# Patient Record
Sex: FEMALE | Race: BLACK OR AFRICAN AMERICAN | NOT HISPANIC OR LATINO
[De-identification: names, ages, dates, MRNs, and addresses within clinical notes are randomized per-mention and may not be internally consistent; named-entity substitution may affect disease eponyms.]

---

## 2021-04-22 ENCOUNTER — TRANSCRIPTION ENCOUNTER (OUTPATIENT)
Age: 66
End: 2021-04-22

## 2021-06-27 ENCOUNTER — TRANSCRIPTION ENCOUNTER (OUTPATIENT)
Age: 66
End: 2021-06-27

## 2022-02-27 ENCOUNTER — TRANSCRIPTION ENCOUNTER (OUTPATIENT)
Age: 67
End: 2022-02-27

## 2022-04-12 ENCOUNTER — TRANSCRIPTION ENCOUNTER (OUTPATIENT)
Age: 67
End: 2022-04-12

## 2022-04-25 PROBLEM — Z00.00 ENCOUNTER FOR PREVENTIVE HEALTH EXAMINATION: Status: ACTIVE | Noted: 2022-04-25

## 2022-04-26 ENCOUNTER — APPOINTMENT (OUTPATIENT)
Dept: HEART AND VASCULAR | Facility: CLINIC | Age: 67
End: 2022-04-26
Payer: MEDICARE

## 2022-04-26 ENCOUNTER — NON-APPOINTMENT (OUTPATIENT)
Age: 67
End: 2022-04-26

## 2022-04-26 VITALS
DIASTOLIC BLOOD PRESSURE: 88 MMHG | HEIGHT: 61 IN | SYSTOLIC BLOOD PRESSURE: 150 MMHG | RESPIRATION RATE: 14 BRPM | HEART RATE: 66 BPM | BODY MASS INDEX: 33.99 KG/M2 | WEIGHT: 180 LBS

## 2022-04-26 DIAGNOSIS — R01.1 CARDIAC MURMUR, UNSPECIFIED: ICD-10-CM

## 2022-04-26 PROCEDURE — ZZZZZ: CPT

## 2022-04-26 PROCEDURE — 93000 ELECTROCARDIOGRAM COMPLETE: CPT

## 2022-04-26 PROCEDURE — 93306 TTE W/DOPPLER COMPLETE: CPT

## 2022-04-26 PROCEDURE — 99203 OFFICE O/P NEW LOW 30 MIN: CPT

## 2022-05-02 NOTE — REASON FOR VISIT
[Hypertension] : hypertension [Other: ____] : [unfilled] [FreeTextEntry3] : Jeanne [FreeTextEntry1] : CV evaluation

## 2022-05-02 NOTE — DISCUSSION/SUMMARY
[ECG] : ECG [Exercise Regimen] : an exercise regimen [Low Sodium Diet] : low sodium diet [NSAIDs Avoidance] : non-steroidal anti-inflammatory drugs avoidance [Mild Mitral Regurgitation] : mild mitral regurgitation [Compensated] : compensated [Echocardiogram] : echocardiogram [None] : There are no changes in medication management [de-identified] : will observe off meds  [FreeTextEntry1] : Echo LVef 55% Mild DD  1+MR \par LA nl size

## 2022-05-02 NOTE — PHYSICAL EXAM
[5th Left ICS - MCL] : palpated at the 5th LICS in the midclavicular line [Rhythm Regular] : regular [Normal S1] : normal S1 [II] : a grade 2 [Normal] : normal gait [Right Carotid Bruit] : no bruit heard over the right carotid [Left Carotid Bruit] : no bruit heard over the left carotid

## 2022-05-02 NOTE — REVIEW OF SYSTEMS
[SOB] : shortness of breath [Negative] : Musculoskeletal [Leg Claudication] : no intermittent leg claudication [Syncope] : no syncope

## 2022-05-02 NOTE — HISTORY OF PRESENT ILLNESS
[FreeTextEntry1] : Patient is a 67-year-old black female with a family history of heart disease in mother who has idiopathic myopathy.  Patient was seen by her medical doctor and referred for cardiovascular evaluation.  She is nondiabetic nonhypertensive non-smoker with mild elevations of her cholesterol.  She history of any overt symptoms of breathlessness chest pain leg edema headaches dizziness palpitations syncope or near syncope.

## 2022-05-04 ENCOUNTER — APPOINTMENT (OUTPATIENT)
Dept: HEART AND VASCULAR | Facility: CLINIC | Age: 67
End: 2022-05-04
Payer: MEDICARE

## 2022-05-04 PROCEDURE — 93015 CV STRESS TEST SUPVJ I&R: CPT

## 2022-11-09 ENCOUNTER — NON-APPOINTMENT (OUTPATIENT)
Age: 67
End: 2022-11-09

## 2023-04-26 ENCOUNTER — APPOINTMENT (OUTPATIENT)
Dept: HEART AND VASCULAR | Facility: CLINIC | Age: 68
End: 2023-04-26
Payer: MEDICARE

## 2023-04-26 ENCOUNTER — NON-APPOINTMENT (OUTPATIENT)
Age: 68
End: 2023-04-26

## 2023-04-26 VITALS — HEART RATE: 65 BPM

## 2023-04-26 VITALS
HEIGHT: 61 IN | BODY MASS INDEX: 34.36 KG/M2 | WEIGHT: 182 LBS | DIASTOLIC BLOOD PRESSURE: 90 MMHG | SYSTOLIC BLOOD PRESSURE: 150 MMHG | HEART RATE: 65 BPM

## 2023-04-26 PROCEDURE — 93000 ELECTROCARDIOGRAM COMPLETE: CPT

## 2023-04-26 PROCEDURE — 99214 OFFICE O/P EST MOD 30 MIN: CPT

## 2023-04-26 NOTE — ADDENDUM
[FreeTextEntry1] : I, Lebron Lopez, assisted in documentation on 04/26/2023 acting as a scribe for Dr. Adonis Melgar.\par \par

## 2023-04-26 NOTE — REASON FOR VISIT
[FreeTextEntry1] : Referred by - Send Report to: Jeanne. \par TREY PLAZA is being seen for hypertension and Murmur. \par \par CV evaluation

## 2023-04-26 NOTE — PHYSICAL EXAM
[Well Developed] : well developed [Well Nourished] : well nourished [No Acute Distress] : no acute distress [Normal Conjunctiva] : normal conjunctiva [Normal Venous Pressure] : normal venous pressure [No Carotid Bruit] : no carotid bruit [5th Left ICS - MCL] : palpated at the 5th LICS in the midclavicular line [Normal] : normal [Rhythm Regular] : regular [Normal S1] : normal S1 [Normal S2] : normal S2 [II] : a grade 2 [Good Air Entry] : good air entry [Clear Lung Fields] : clear lung fields [No Respiratory Distress] : no respiratory distress  [Soft] : abdomen soft [Non Tender] : non-tender [No Masses/organomegaly] : no masses/organomegaly [Normal Bowel Sounds] : normal bowel sounds [Normal Gait] : normal gait [Right Carotid Bruit] : no bruit heard over the right carotid [Left Carotid Bruit] : no bruit heard over the left carotid

## 2023-04-26 NOTE — HISTORY OF PRESENT ILLNESS
[FreeTextEntry1] : 04/26/22\par Patient is a 67-year-old black female with a family history of heart disease in mother who has idiopathic myopathy. Patient was seen by her medical doctor and referred for cardiovascular evaluation. She is nondiabetic nonhypertensive non-smoker with mild elevations of her cholesterol. She history of any overt symptoms of breathlessness chest pain leg edema headaches dizziness palpitations syncope or near syncope. \par \par 04/26/23\par seen by PMD blood were normal \par no barrientos or headache or barrientos \par no swelling \par walks up to 1/2 hour per day slow pace

## 2023-04-26 NOTE — DISCUSSION/SUMMARY
[FreeTextEntry1] : Hypertension: The impression is will observe off meds. The diagnostic plan includes ECG and echocardiogram.There are no changes in medication management. Other planned treatment includes an exercise regimen, low sodium diet, non-steroidal anti-inflammatory drugs avoidance and wgt loss. \par Mitral Regurgitation: The impression is mild mitral regurgitation. Currently, the condition is compensated. The diagnostic plan includes echocardiogram.There are no changes in medication management. \par Echo LVef 55% Mild DD 1+MR \par LA nl size. \par  [EKG obtained to assist in diagnosis and management of assessed problem(s)] : EKG obtained to assist in diagnosis and management of assessed problem(s)

## 2023-04-27 ENCOUNTER — APPOINTMENT (OUTPATIENT)
Dept: HEART AND VASCULAR | Facility: CLINIC | Age: 68
End: 2023-04-27

## 2024-03-06 ENCOUNTER — NON-APPOINTMENT (OUTPATIENT)
Age: 69
End: 2024-03-06

## 2024-03-06 ENCOUNTER — APPOINTMENT (OUTPATIENT)
Dept: HEART AND VASCULAR | Facility: CLINIC | Age: 69
End: 2024-03-06
Payer: MEDICARE

## 2024-03-06 VITALS
HEART RATE: 69 BPM | SYSTOLIC BLOOD PRESSURE: 130 MMHG | BODY MASS INDEX: 34.74 KG/M2 | WEIGHT: 184 LBS | HEIGHT: 61 IN | DIASTOLIC BLOOD PRESSURE: 80 MMHG

## 2024-03-06 DIAGNOSIS — I34.0 NONRHEUMATIC MITRAL (VALVE) INSUFFICIENCY: ICD-10-CM

## 2024-03-06 PROCEDURE — 93000 ELECTROCARDIOGRAM COMPLETE: CPT

## 2024-03-06 PROCEDURE — 99213 OFFICE O/P EST LOW 20 MIN: CPT

## 2024-03-06 NOTE — PHYSICAL EXAM
[Well Developed] : well developed [Well Nourished] : well nourished [Normal Conjunctiva] : normal conjunctiva [No Acute Distress] : no acute distress [Normal Venous Pressure] : normal venous pressure [No Carotid Bruit] : no carotid bruit [5th Left ICS - MCL] : palpated at the 5th LICS in the midclavicular line [Normal] : normal [Rhythm Regular] : regular [Normal S1] : normal S1 [II] : a grade 2 [Normal S2] : normal S2 [Clear Lung Fields] : clear lung fields [Good Air Entry] : good air entry [Soft] : abdomen soft [No Respiratory Distress] : no respiratory distress  [Normal Bowel Sounds] : normal bowel sounds [No Masses/organomegaly] : no masses/organomegaly [Non Tender] : non-tender [Normal Gait] : normal gait [Right Carotid Bruit] : no bruit heard over the right carotid [de-identified] : NEG TINEL sign [Left Carotid Bruit] : no bruit heard over the left carotid

## 2024-03-06 NOTE — DISCUSSION/SUMMARY
[EKG obtained to assist in diagnosis and management of assessed problem(s)] : EKG obtained to assist in diagnosis and management of assessed problem(s) [FreeTextEntry1] : Hypertension: The impression is will observe off meds. The diagnostic plan includes ECG and echocardiogram.There are no changes in medication management. Other planned treatment includes an exercise regimen, low sodium diet, non-steroidal anti-inflammatory drugs avoidance and wgt loss.  Mitral Regurgitation: The impression is mild mitral regurgitation. Currently, the condition is compensated. The diagnostic plan includes echocardiogram.There are no changes in medication management.

## 2024-03-06 NOTE — HISTORY OF PRESENT ILLNESS
[FreeTextEntry1] : 04/26/22 Patient is a 67-year-old black female with a family history of heart disease in mother who has idiopathic myopathy. Patient was seen by her medical doctor and referred for cardiovascular evaluation. She is nondiabetic nonhypertensive non-smoker with mild elevations of her cholesterol. She history of any overt symptoms of breathlessness chest pain leg edema headaches dizziness palpitations syncope or near syncope.   04/26/23 seen by PMD blood were normal  no barrientos or headache or barrientos  no swelling  walks up to 1/2 hour per day slow pace   3/6/24 on no meds feels well no sscp or barrientos  skipped HB at times  no dizziness  goes gym and exercise regularly  left hand numbness

## 2024-05-01 ENCOUNTER — APPOINTMENT (OUTPATIENT)
Dept: HEART AND VASCULAR | Facility: CLINIC | Age: 69
End: 2024-05-01
Payer: MEDICARE

## 2024-05-01 DIAGNOSIS — I10 ESSENTIAL (PRIMARY) HYPERTENSION: ICD-10-CM

## 2024-05-01 PROCEDURE — 93306 TTE W/DOPPLER COMPLETE: CPT

## 2025-06-16 ENCOUNTER — APPOINTMENT (OUTPATIENT)
Dept: HEART AND VASCULAR | Facility: CLINIC | Age: 70
End: 2025-06-16
Payer: MEDICARE

## 2025-06-16 ENCOUNTER — NON-APPOINTMENT (OUTPATIENT)
Age: 70
End: 2025-06-16

## 2025-06-16 VITALS — DIASTOLIC BLOOD PRESSURE: 95 MMHG | SYSTOLIC BLOOD PRESSURE: 148 MMHG

## 2025-06-16 VITALS
BODY MASS INDEX: 34.36 KG/M2 | WEIGHT: 182 LBS | SYSTOLIC BLOOD PRESSURE: 154 MMHG | DIASTOLIC BLOOD PRESSURE: 100 MMHG | HEART RATE: 69 BPM | HEIGHT: 61 IN

## 2025-06-16 PROCEDURE — G2211 COMPLEX E/M VISIT ADD ON: CPT

## 2025-06-16 PROCEDURE — 93000 ELECTROCARDIOGRAM COMPLETE: CPT

## 2025-06-16 PROCEDURE — 93306 TTE W/DOPPLER COMPLETE: CPT

## 2025-06-16 PROCEDURE — 99214 OFFICE O/P EST MOD 30 MIN: CPT

## 2025-06-16 RX ORDER — VALSARTAN 160 MG/1
160 TABLET, COATED ORAL
Qty: 90 | Refills: 1 | Status: ACTIVE | COMMUNITY
Start: 2025-06-16 | End: 1900-01-01